# Patient Record
Sex: FEMALE | Race: BLACK OR AFRICAN AMERICAN | NOT HISPANIC OR LATINO | Employment: PART TIME | URBAN - METROPOLITAN AREA
[De-identification: names, ages, dates, MRNs, and addresses within clinical notes are randomized per-mention and may not be internally consistent; named-entity substitution may affect disease eponyms.]

---

## 2018-07-07 ENCOUNTER — HOSPITAL ENCOUNTER (EMERGENCY)
Facility: HOSPITAL | Age: 53
Discharge: HOME | End: 2018-07-07
Attending: EMERGENCY MEDICINE
Payer: MEDICAID

## 2018-07-07 VITALS
BODY MASS INDEX: 34.49 KG/M2 | OXYGEN SATURATION: 99 % | HEIGHT: 60 IN | HEART RATE: 76 BPM | DIASTOLIC BLOOD PRESSURE: 76 MMHG | WEIGHT: 175.69 LBS | SYSTOLIC BLOOD PRESSURE: 134 MMHG | TEMPERATURE: 98 F | RESPIRATION RATE: 17 BRPM

## 2018-07-07 DIAGNOSIS — K92.1 BLOOD IN STOOL: Primary | ICD-10-CM

## 2018-07-07 DIAGNOSIS — K64.4 EXTERNAL HEMORRHOID: ICD-10-CM

## 2018-07-07 LAB
ALBUMIN SERPL BCP-MCNC: 3.7 G/DL
ALP SERPL-CCNC: 147 U/L
ALT SERPL W/O P-5'-P-CCNC: 28 U/L
ANION GAP SERPL CALC-SCNC: 9 MMOL/L
AST SERPL-CCNC: 19 U/L
BASOPHILS # BLD AUTO: 0.02 K/UL
BASOPHILS NFR BLD: 0.3 %
BILIRUB SERPL-MCNC: 0.4 MG/DL
BUN SERPL-MCNC: 11 MG/DL
CALCIUM SERPL-MCNC: 9.7 MG/DL
CHLORIDE SERPL-SCNC: 106 MMOL/L
CO2 SERPL-SCNC: 26 MMOL/L
CREAT SERPL-MCNC: 0.7 MG/DL
DIFFERENTIAL METHOD: ABNORMAL
EOSINOPHIL # BLD AUTO: 0.3 K/UL
EOSINOPHIL NFR BLD: 4.6 %
ERYTHROCYTE [DISTWIDTH] IN BLOOD BY AUTOMATED COUNT: 14.5 %
EST. GFR  (AFRICAN AMERICAN): >60 ML/MIN/1.73 M^2
EST. GFR  (NON AFRICAN AMERICAN): >60 ML/MIN/1.73 M^2
GLUCOSE SERPL-MCNC: 99 MG/DL
HCT VFR BLD AUTO: 35.2 %
HGB BLD-MCNC: 11.1 G/DL
IMM GRANULOCYTES # BLD AUTO: 0.03 K/UL
IMM GRANULOCYTES NFR BLD AUTO: 0.4 %
LYMPHOCYTES # BLD AUTO: 1.9 K/UL
LYMPHOCYTES NFR BLD: 28.2 %
MCH RBC QN AUTO: 28.5 PG
MCHC RBC AUTO-ENTMCNC: 31.5 G/DL
MCV RBC AUTO: 91 FL
MONOCYTES # BLD AUTO: 0.4 K/UL
MONOCYTES NFR BLD: 6.5 %
NEUTROPHILS # BLD AUTO: 4 K/UL
NEUTROPHILS NFR BLD: 60 %
NRBC BLD-RTO: 0 /100 WBC
PLATELET # BLD AUTO: 273 K/UL
PMV BLD AUTO: 10.6 FL
POTASSIUM SERPL-SCNC: 4.2 MMOL/L
PROT SERPL-MCNC: 8.2 G/DL
RBC # BLD AUTO: 3.89 M/UL
SODIUM SERPL-SCNC: 141 MMOL/L
WBC # BLD AUTO: 6.74 K/UL

## 2018-07-07 PROCEDURE — 99282 EMERGENCY DEPT VISIT SF MDM: CPT | Mod: ,,, | Performed by: PHYSICIAN ASSISTANT

## 2018-07-07 PROCEDURE — 87493 C DIFF AMPLIFIED PROBE: CPT

## 2018-07-07 PROCEDURE — 87324 CLOSTRIDIUM AG IA: CPT

## 2018-07-07 PROCEDURE — 85025 COMPLETE CBC W/AUTO DIFF WBC: CPT

## 2018-07-07 PROCEDURE — 99283 EMERGENCY DEPT VISIT LOW MDM: CPT

## 2018-07-07 PROCEDURE — 80053 COMPREHEN METABOLIC PANEL: CPT

## 2018-07-07 RX ORDER — CLINDAMYCIN HYDROCHLORIDE 150 MG/1
150 CAPSULE ORAL
COMMUNITY
End: 2018-07-09 | Stop reason: ALTCHOICE

## 2018-07-07 NOTE — ED NOTES
Patient received with complaint of 1 episode of rectal bleeding; dark red in color; Pt. Reports recent mouth abscess and was prescribed clindamycin taking approximately 600mg per day for past week.  Saturday and Sunday pt. Reports taking 1120mg total per day.  Pt. Reports that dose made her sick.  Monday-Friday pt. Took 600mg total per day.  Pt. Did not take any of it today.   denies shortness of breath, dizziness, or chest pain.      No LDA's in place on arrival to department.    No family present.    Pain:  Rated 5/10. Cramping pain in abdomen    Psychosocial:  Patient is calm and cooperative.  Patients insight and judgement are appropriate to situation.  Appears clean, well maintained, with clothing appropriate to environment.  No evidence of hallucinations, delusions, or psychosis.    Neuro:  Eyes open spontaneously.  Awake, alert.  Oriented x 4.  Speech clear and appropriate.  Tolerating saliva secretions well.  Able to follow commands, demonstrating ability to actively and appropriately communicate within context of current conversation.  Symmetrical facial muscles.  Moving all extremities well with no noted weakness.      Airway:  Bilateral chest rise and fall.  RR regular and non labored.  Air entry patent and clear to lobes of the lungs.     Circulatory:  Skin warm, dry.  Apical and radial pulses strong and regular.  Capillary refill/skin blanching less than 3 seconds to distal of 4 extremities.    Abdomen:  Abdomen soft and non distended.  Positive normo-active bowel sounds x 4 quadrants.    Urinary:  Patient reports routine urination without pain, frequency, or urgency.  Voids independently.  Reports urine appears kirk/yellow in color.    Extremities:  No redness, heat, swelling, deformity, or pain.    Skin:  Intact with no bruising/discolorations noted.

## 2018-07-07 NOTE — ED NOTES
Pt placed on continuous cardiac and pulse ox monitoring with non-invasive blood pressure to cycle every 30 minutes.

## 2018-07-07 NOTE — DISCHARGE INSTRUCTIONS
Take probiotic after finishing antibiotic prescription. If anything concerning shows up in your stool sample, we will call you. Return to the ED immediately if you develop worsening diarrhea, abdominal pain, more blood in stool, or become dizzy or faint.    Our goal in the emergency department is to always give you outstanding care and exceptional service. You may receive a survey by mail or e-mail in the next week regarding your experience in our ED. We would greatly appreciate your completing and returning the survey. Your feedback provides us with a way to recognize our staff who give very good care and it helps us learn how to improve when your experience was below our aspiration of excellence.

## 2018-07-08 LAB
C DIFF GDH STL QL: POSITIVE
C DIFF TOX A+B STL QL IA: NEGATIVE
C DIFF TOX GENS STL QL NAA+PROBE: POSITIVE

## 2018-07-09 ENCOUNTER — HOSPITAL ENCOUNTER (EMERGENCY)
Facility: HOSPITAL | Age: 53
Discharge: HOME | End: 2018-07-09
Attending: EMERGENCY MEDICINE
Payer: MEDICAID

## 2018-07-09 VITALS
WEIGHT: 175 LBS | HEART RATE: 87 BPM | TEMPERATURE: 99 F | SYSTOLIC BLOOD PRESSURE: 124 MMHG | RESPIRATION RATE: 19 BRPM | HEIGHT: 60 IN | OXYGEN SATURATION: 100 % | BODY MASS INDEX: 34.36 KG/M2 | DIASTOLIC BLOOD PRESSURE: 63 MMHG

## 2018-07-09 DIAGNOSIS — R50.9 FEVER: ICD-10-CM

## 2018-07-09 DIAGNOSIS — A04.72 CLOSTRIDIUM DIFFICILE DIARRHEA: Primary | ICD-10-CM

## 2018-07-09 LAB
ALBUMIN SERPL BCP-MCNC: 3.3 G/DL
ALP SERPL-CCNC: 128 U/L
ALT SERPL W/O P-5'-P-CCNC: 22 U/L
ANION GAP SERPL CALC-SCNC: 11 MMOL/L
AST SERPL-CCNC: 17 U/L
BASOPHILS # BLD AUTO: 0.02 K/UL
BASOPHILS NFR BLD: 0.2 %
BILIRUB SERPL-MCNC: 0.5 MG/DL
BILIRUB UR QL STRIP: NEGATIVE
BUN SERPL-MCNC: 9 MG/DL
CALCIUM SERPL-MCNC: 9 MG/DL
CHLORIDE SERPL-SCNC: 106 MMOL/L
CLARITY UR REFRACT.AUTO: ABNORMAL
CO2 SERPL-SCNC: 21 MMOL/L
COLOR UR AUTO: YELLOW
CREAT SERPL-MCNC: 0.7 MG/DL
DIFFERENTIAL METHOD: ABNORMAL
EOSINOPHIL # BLD AUTO: 0.2 K/UL
EOSINOPHIL NFR BLD: 2.7 %
ERYTHROCYTE [DISTWIDTH] IN BLOOD BY AUTOMATED COUNT: 14.7 %
EST. GFR  (AFRICAN AMERICAN): >60 ML/MIN/1.73 M^2
EST. GFR  (NON AFRICAN AMERICAN): >60 ML/MIN/1.73 M^2
GLUCOSE SERPL-MCNC: 106 MG/DL
GLUCOSE UR QL STRIP: NEGATIVE
HCT VFR BLD AUTO: 33.5 %
HGB BLD-MCNC: 10.7 G/DL
HGB UR QL STRIP: NEGATIVE
IMM GRANULOCYTES # BLD AUTO: 0.01 K/UL
IMM GRANULOCYTES NFR BLD AUTO: 0.1 %
KETONES UR QL STRIP: NEGATIVE
LACTATE SERPL-SCNC: 1.3 MMOL/L
LEUKOCYTE ESTERASE UR QL STRIP: NEGATIVE
LYMPHOCYTES # BLD AUTO: 1.5 K/UL
LYMPHOCYTES NFR BLD: 17.7 %
MCH RBC QN AUTO: 28.6 PG
MCHC RBC AUTO-ENTMCNC: 31.9 G/DL
MCV RBC AUTO: 90 FL
MONOCYTES # BLD AUTO: 0.5 K/UL
MONOCYTES NFR BLD: 5.7 %
NEUTROPHILS # BLD AUTO: 6.3 K/UL
NEUTROPHILS NFR BLD: 73.6 %
NITRITE UR QL STRIP: NEGATIVE
NRBC BLD-RTO: 0 /100 WBC
PH UR STRIP: 7 [PH] (ref 5–8)
PLATELET # BLD AUTO: 250 K/UL
PMV BLD AUTO: 10.9 FL
POTASSIUM SERPL-SCNC: 4 MMOL/L
PROT SERPL-MCNC: 7.5 G/DL
PROT UR QL STRIP: NEGATIVE
RBC # BLD AUTO: 3.74 M/UL
SODIUM SERPL-SCNC: 138 MMOL/L
SP GR UR STRIP: 1.01 (ref 1–1.03)
URN SPEC COLLECT METH UR: ABNORMAL
UROBILINOGEN UR STRIP-ACNC: NEGATIVE EU/DL
WBC # BLD AUTO: 8.53 K/UL

## 2018-07-09 PROCEDURE — 80053 COMPREHEN METABOLIC PANEL: CPT

## 2018-07-09 PROCEDURE — 85025 COMPLETE CBC W/AUTO DIFF WBC: CPT

## 2018-07-09 PROCEDURE — 99285 EMERGENCY DEPT VISIT HI MDM: CPT | Mod: ,,, | Performed by: PHYSICIAN ASSISTANT

## 2018-07-09 PROCEDURE — 87040 BLOOD CULTURE FOR BACTERIA: CPT | Mod: 59

## 2018-07-09 PROCEDURE — 99284 EMERGENCY DEPT VISIT MOD MDM: CPT | Mod: 25

## 2018-07-09 PROCEDURE — 83605 ASSAY OF LACTIC ACID: CPT

## 2018-07-09 PROCEDURE — 93005 ELECTROCARDIOGRAM TRACING: CPT

## 2018-07-09 PROCEDURE — 25000003 PHARM REV CODE 250: Performed by: EMERGENCY MEDICINE

## 2018-07-09 PROCEDURE — 81003 URINALYSIS AUTO W/O SCOPE: CPT

## 2018-07-09 PROCEDURE — 96361 HYDRATE IV INFUSION ADD-ON: CPT

## 2018-07-09 PROCEDURE — 25000003 PHARM REV CODE 250: Performed by: PHYSICIAN ASSISTANT

## 2018-07-09 PROCEDURE — 96360 HYDRATION IV INFUSION INIT: CPT

## 2018-07-09 PROCEDURE — 93010 ELECTROCARDIOGRAM REPORT: CPT | Mod: ,,, | Performed by: INTERNAL MEDICINE

## 2018-07-09 RX ORDER — METRONIDAZOLE 500 MG/1
500 TABLET ORAL 3 TIMES DAILY
Qty: 42 TABLET | Refills: 0 | Status: SHIPPED | OUTPATIENT
Start: 2018-07-09 | End: 2018-07-23

## 2018-07-09 RX ORDER — ACETAMINOPHEN 325 MG/1
650 TABLET ORAL
Status: COMPLETED | OUTPATIENT
Start: 2018-07-09 | End: 2018-07-09

## 2018-07-09 RX ORDER — VANCOMYCIN HYDROCHLORIDE 125 MG/1
125 CAPSULE ORAL EVERY 6 HOURS
Qty: 56 CAPSULE | Refills: 0 | Status: SHIPPED | OUTPATIENT
Start: 2018-07-09 | End: 2018-07-09 | Stop reason: ALTCHOICE

## 2018-07-09 RX ADMIN — ACETAMINOPHEN 650 MG: 325 TABLET, FILM COATED ORAL at 10:07

## 2018-07-09 RX ADMIN — Medication 125 MG: at 10:07

## 2018-07-09 RX ADMIN — SODIUM CHLORIDE 2382 ML: 0.9 INJECTION, SOLUTION INTRAVENOUS at 08:07

## 2018-07-09 NOTE — ED PROVIDER NOTES
Encounter Date: 7/9/2018    SCRIBE #1 NOTE: I, Mary Muhammad, am scribing for, and in the presence of,  Dr. Lozano. I have scribed the following portions of the note - the APC attestation.       History     Chief Complaint   Patient presents with    Rectal Bleeding     now fever, seen here 2d ago and waiting stool sample results, + cdiff resulted     This is a 53 year old female with a PMH of remote PE who presents to the ED with a chief complaint of diarrhea. Patient was recently treated for a dental abscess with clindamycin. She presented over the weekend with bloody diarrhea. Stool cultures are positive for C Diff antigen and by PCR. Patient reports onset of fever and chills yesterday. She continues to have approximately 3 episodes of bloody diarrhea daily. She is tolerating normal oral intake, denies URI syomptoms, chest pain, SOB, vomiting, abdominal pain, dysuria, or additional complaints. Patient reports a baseline hx of anemia.          Review of patient's allergies indicates:  No Known Allergies  Past Medical History:   Diagnosis Date    Anticoagulant long-term use     [id number]    Pulmonary embolism     2012     History reviewed. No pertinent surgical history.  History reviewed. No pertinent family history.  Social History   Substance Use Topics    Smoking status: Never Smoker    Smokeless tobacco: Never Used    Alcohol use No     Review of Systems   Constitutional: Negative for fever.   HENT: Negative for sore throat.    Respiratory: Negative for shortness of breath.    Cardiovascular: Negative for chest pain.   Gastrointestinal: Positive for diarrhea. Negative for abdominal pain, nausea and vomiting.   Genitourinary: Negative for dysuria.   Musculoskeletal: Negative for back pain.   Skin: Negative for rash.   Neurological: Negative for weakness.   Hematological: Does not bruise/bleed easily.       Physical Exam     Initial Vitals [07/09/18 0823]   BP Pulse Resp Temp SpO2   139/84 110 16 (!)  100.4 °F (38 °C) 96 %      MAP       --         Physical Exam    Constitutional: She appears well-developed and well-nourished.   HENT:   Head: Atraumatic.   Eyes: Conjunctivae and EOM are normal. Pupils are equal, round, and reactive to light.   Cardiovascular: Regular rhythm and normal heart sounds. Tachycardia present.    Pulmonary/Chest: Breath sounds normal. No respiratory distress. She has no wheezes. She has no rhonchi. She has no rales.   Abdominal: Soft. Bowel sounds are normal. There is no tenderness. There is no rebound and no guarding.   Neurological: She is alert and oriented to person, place, and time.   Skin: Skin is warm and dry. No rash noted.         ED Course   Procedures  Labs Reviewed   CBC W/ AUTO DIFFERENTIAL - Abnormal; Notable for the following:        Result Value    RBC 3.74 (*)     Hemoglobin 10.7 (*)     Hematocrit 33.5 (*)     MCHC 31.9 (*)     RDW 14.7 (*)     Gran% 73.6 (*)     Lymph% 17.7 (*)     All other components within normal limits   COMPREHENSIVE METABOLIC PANEL - Abnormal; Notable for the following:     CO2 21 (*)     Albumin 3.3 (*)     All other components within normal limits   URINALYSIS, REFLEX TO URINE CULTURE - Abnormal; Notable for the following:     Appearance, UA Hazy (*)     All other components within normal limits    Narrative:     Preferred Collection Type->Urine, Clean Catch   CULTURE, BLOOD    Narrative:     Aerobic and anaerobic   CULTURE, BLOOD    Narrative:     Aerobic and anaerobic   LACTIC ACID, PLASMA          Imaging Results          X-Ray Chest AP Portable (Final result)  Result time 07/09/18 09:22:21    Final result by Nilay Bermeo III, MD (07/09/18 09:22:21)                 Impression:      See above    No acute process seen.      Electronically signed by: Nilay Bermeo MD  Date:    07/09/2018  Time:    09:22             Narrative:    EXAMINATION:  XR CHEST AP PORTABLE    CLINICAL HISTORY:  Sepsis;    FINDINGS:  Heart size is normal.  Lungs are  clear.  Bones showed DJD.                                 Medical Decision Making:   History:   Old Medical Records: I decided to obtain old medical records.  Clinical Tests:   Lab Tests: Ordered and Reviewed  Radiological Study: Reviewed and Ordered  Medical Tests: Reviewed and Ordered       APC / Resident Notes:   53 year old female presents with persistent bloody diarrhea and fever.  On exam temp is 100.4F, . She appears stable, nontoxic. Abdomen is soft, nontender.     I reviewed her stool cultures - C Diff antigen positive, PCR positive.  Will check labs, give fluids, and reassess.    Labs with no leukocytosis, H&H 10/33 from 11/35 on last visit (7/7/18). Renal function wnl. Negative lactate. Blood cultures are in process.    I discussed these findings with the patient. She is clinically well appearing and with improvement in her vitals I feel that she would be a good candidate for outpatient treatment. Offered admission for observation, shared decision making with patient who agrees with plan for outpatient treatment. Discussed oral vancomycin as first line medication, however given cost of the medication and first infection we elected to initiate treatment with Flagyl, however I did discuss with the patient the potential need for advancing to vanc if she fails to improve on the Flagyl. Will call to follow up with her in a few days. Detailed return to ED precautions discussed. Patient and family member verbalized understanding and agree with course of treatment. I discussed the care of this patient with my supervising MD.        Lenoreibe Attestation:   Scribe #1: I performed the above scribed service and the documentation accurately describes the services I performed. I attest to the accuracy of the note.    Attending Attestation:     Physician Attestation Statement for NP/PA:   I discussed this assessment and plan of this patient with the NP/PA, but I did not personally examine the patient. The face to  face encounter was performed by the NP/PA.                     Clinical Impression:   The primary encounter diagnosis was Clostridium difficile diarrhea. A diagnosis of Fever was also pertinent to this visit.      Disposition:   Disposition: Discharged  Condition: Stable                        Alissa Pemberton PA-C  07/09/18 1538       Kedar Lozano MD  07/13/18 0873

## 2018-07-09 NOTE — ED NOTES
Visitor arrived at pt bedside, with pt permission visitor educated on proper PPE and provided gown and gloves, verbalized understanding.

## 2018-07-09 NOTE — ED NOTES
HOURLY ROUNDING COMPLETE. Patient resting in stretcher and is in NAD at this time. Pt is awake alert and oriented x4, respirations even and unlabored. Pt updated on POC. Visitor at bedside. Bed low and locked with side rails up x2, call bell in pt reach. Pt voices no needs at this time.

## 2018-07-09 NOTE — ED NOTES
Bedside commode brought to bedside, pt urinated and had BM, urine yellow and clear, BM soft formed yellow with bright red blood noted.

## 2018-07-09 NOTE — ED TRIAGE NOTES
"Pt arrived to ED with CC of rectal bleeding onset "last week" - reports blood became darker and fever onset this morning. Pt reports also intermittent abdominal cramping and SOB. Denies CP or nausea. No other complaints at this time.    Pt reports seen for diarrhea on Friday for diarrhea and provided stool sample for testing, pt c-diff positive per chart, special contact isolation precautions initiated.     Patient identifiers verified and correct for Brando Leung.    LOC: The patient is awake, alert and oriented x 4. Pt is speaking appropriately, no slurred speech.  APPEARANCE: Patient resting and in no acute distress. Pt is clean and well groomed. No JVD visible.  SKIN: Skin is warm dry and intact, and color is consistent with ethnicity. No tenting observed and capillary refill <3 seconds. No clubbing noted to nail beds. No breakdown or brusing visible and mucus membranes moist and acyanotic.  MUSCULOSKELETAL: Full range of motion present in all extremities. Hand  equal and leg strength strong +5 bilaterally.  RESPIRATORY: Airway is open and patent. Respirations-unlabored, regular rate, equal bilaterally on inspiration and expiration. No accessory muscle use noted. Lungs clear to auscultation in all fields bilaterally anterior and posterior.   CARDIAC: Patient tachycardic. No peripheral edema noted, and patient has no c/o chest pain.  ABDOMEN: Soft, mild tenderness noted throughout, no distention noted.   NEUROLOGIC: Eyes open spontaneously and facial expression symmetrical. Pt behavior appropriate to situation, and pt follows commands.  Pt reports sensation present in all extremities when touched with a finger. PERRLA  : No complaints of frequency, burning, urgency or blood in the urine.     "

## 2018-07-09 NOTE — DISCHARGE INSTRUCTIONS
Start taking Flagyl three times a day for 14 days.    Take Tylenol or ibuprofen as directed for fever.    Return to the ER if you have  fever that is not controlled with tylenol or ibuprofen, vomiting, abdominal pain, weakness, dizziness, or feeling faint, or for any new or worsening symptoms.

## 2018-07-09 NOTE — ED NOTES
Pt. Resting in bed in NAD. RR e/u. Continuous cardiac, BP, and O2 monitoring in progress. Explanation of care/wait provided. Bed in low, locked position with rails up and call bell in reach. Will continue to monitor.

## 2018-07-13 ENCOUNTER — TELEPHONE (OUTPATIENT)
Dept: EMERGENCY MEDICINE | Facility: HOSPITAL | Age: 53
End: 2018-07-13

## 2018-07-13 NOTE — TELEPHONE ENCOUNTER
Contacted patient to f/u from her recent ED visit with C diff diarrhea. She reports significant improvement with resolution of fever, rectal bleeding, and diarrhea. She is taking Flagyl, as prescribed. She plans to see internal medicine next week. Advised pt to continue with antibiotics until completion and return to ED precautions were discussed.

## 2018-07-14 LAB
BACTERIA BLD CULT: NORMAL
BACTERIA BLD CULT: NORMAL

## 2019-02-01 ENCOUNTER — HOSPITAL ENCOUNTER (EMERGENCY)
Facility: HOSPITAL | Age: 54
Discharge: HOME | End: 2019-02-02
Attending: EMERGENCY MEDICINE
Payer: MEDICAID

## 2019-02-01 VITALS
SYSTOLIC BLOOD PRESSURE: 164 MMHG | BODY MASS INDEX: 33.04 KG/M2 | WEIGHT: 175 LBS | TEMPERATURE: 98 F | RESPIRATION RATE: 18 BRPM | OXYGEN SATURATION: 98 % | HEART RATE: 80 BPM | DIASTOLIC BLOOD PRESSURE: 79 MMHG | HEIGHT: 61 IN

## 2019-02-01 DIAGNOSIS — M25.539 WRIST PAIN, ACUTE, UNSPECIFIED LATERALITY: ICD-10-CM

## 2019-02-01 DIAGNOSIS — M54.5 ACUTE LOW BACK PAIN, UNSPECIFIED BACK PAIN LATERALITY, WITH SCIATICA PRESENCE UNSPECIFIED: Primary | ICD-10-CM

## 2019-02-01 DIAGNOSIS — H93.19 TINNITUS, UNSPECIFIED LATERALITY: ICD-10-CM

## 2019-02-01 LAB
ALBUMIN SERPL BCP-MCNC: 3.9 G/DL
ALP SERPL-CCNC: 169 U/L
ALT SERPL W/O P-5'-P-CCNC: 21 U/L
ANION GAP SERPL CALC-SCNC: 12 MMOL/L
AST SERPL-CCNC: 23 U/L
B-HCG UR QL: NEGATIVE
BASOPHILS # BLD AUTO: 0.03 K/UL
BASOPHILS NFR BLD: 0.4 %
BILIRUB SERPL-MCNC: 0.4 MG/DL
BUN SERPL-MCNC: 11 MG/DL
CALCIUM SERPL-MCNC: 9.9 MG/DL
CHLORIDE SERPL-SCNC: 105 MMOL/L
CO2 SERPL-SCNC: 21 MMOL/L
CREAT SERPL-MCNC: 0.7 MG/DL
CTP QC/QA: YES
DIFFERENTIAL METHOD: ABNORMAL
EOSINOPHIL # BLD AUTO: 0.2 K/UL
EOSINOPHIL NFR BLD: 2.4 %
ERYTHROCYTE [DISTWIDTH] IN BLOOD BY AUTOMATED COUNT: 15.1 %
EST. GFR  (AFRICAN AMERICAN): >60 ML/MIN/1.73 M^2
EST. GFR  (NON AFRICAN AMERICAN): >60 ML/MIN/1.73 M^2
GLUCOSE SERPL-MCNC: 96 MG/DL
HCT VFR BLD AUTO: 40.2 %
HGB BLD-MCNC: 12.9 G/DL
IMM GRANULOCYTES # BLD AUTO: 0.02 K/UL
IMM GRANULOCYTES NFR BLD AUTO: 0.3 %
LIPASE SERPL-CCNC: 62 U/L
LYMPHOCYTES # BLD AUTO: 2.3 K/UL
LYMPHOCYTES NFR BLD: 34.2 %
MCH RBC QN AUTO: 29.1 PG
MCHC RBC AUTO-ENTMCNC: 32.1 G/DL
MCV RBC AUTO: 91 FL
MONOCYTES # BLD AUTO: 0.3 K/UL
MONOCYTES NFR BLD: 4 %
NEUTROPHILS # BLD AUTO: 4 K/UL
NEUTROPHILS NFR BLD: 58.7 %
NRBC BLD-RTO: 0 /100 WBC
PLATELET # BLD AUTO: 252 K/UL
PMV BLD AUTO: 11 FL
POTASSIUM SERPL-SCNC: 4.9 MMOL/L
PROT SERPL-MCNC: 8.8 G/DL
RBC # BLD AUTO: 4.43 M/UL
SODIUM SERPL-SCNC: 138 MMOL/L
WBC # BLD AUTO: 6.78 K/UL

## 2019-02-01 PROCEDURE — 99284 EMERGENCY DEPT VISIT MOD MDM: CPT | Mod: 25

## 2019-02-01 PROCEDURE — 85379 FIBRIN DEGRADATION QUANT: CPT

## 2019-02-01 PROCEDURE — 85025 COMPLETE CBC W/AUTO DIFF WBC: CPT

## 2019-02-01 PROCEDURE — 99284 EMERGENCY DEPT VISIT MOD MDM: CPT | Mod: ,,, | Performed by: EMERGENCY MEDICINE

## 2019-02-01 PROCEDURE — 81025 URINE PREGNANCY TEST: CPT | Performed by: EMERGENCY MEDICINE

## 2019-02-01 PROCEDURE — 80053 COMPREHEN METABOLIC PANEL: CPT

## 2019-02-01 PROCEDURE — 25000003 PHARM REV CODE 250: Performed by: EMERGENCY MEDICINE

## 2019-02-01 PROCEDURE — 99284 PR EMERGENCY DEPT VISIT,LEVEL IV: ICD-10-PCS | Mod: ,,, | Performed by: EMERGENCY MEDICINE

## 2019-02-01 PROCEDURE — 83690 ASSAY OF LIPASE: CPT

## 2019-02-01 RX ORDER — ACETAMINOPHEN 325 MG/1
650 TABLET ORAL
Status: COMPLETED | OUTPATIENT
Start: 2019-02-01 | End: 2019-02-01

## 2019-02-01 RX ORDER — ACETAMINOPHEN 500 MG
500 TABLET ORAL EVERY 6 HOURS PRN
COMMUNITY

## 2019-02-01 RX ADMIN — ACETAMINOPHEN 650 MG: 325 TABLET ORAL at 11:02

## 2019-02-02 LAB — D DIMER PPP IA.FEU-MCNC: 1.1 MG/L FEU

## 2019-02-02 PROCEDURE — 25500020 PHARM REV CODE 255: Performed by: EMERGENCY MEDICINE

## 2019-02-02 RX ADMIN — IOHEXOL 75 ML: 350 INJECTION, SOLUTION INTRAVENOUS at 01:02

## 2019-02-02 NOTE — ED PROVIDER NOTES
Encounter Date: 2/1/2019    SCRIBE #1 NOTE: I, Jose Dunbar , am scribing for, and in the presence of,  Christian Chi MD. I have scribed the entire note.       History     Chief Complaint   Patient presents with    Back Pain     reports lower back pain, bilateral wrist pain and ears ringing     Pt is a 54 yo F with pmhx of PE, long-term anticoagulant use, self d/c, and C. Diff, pancreatitis?  presents to the ED for evaluation of lower back pain, ear ringing, and wrist pain. Also notes SOB and cough. States that she has had back pain for months, no weakness, numbness, urinary bowel incontinence, no saddle anesthesia, no trauma. No dysuria, no no hematuria.    Her ear ringing has been going on for 3 days.  No ear pain.  No significant headache. No significant changes in hearing.  She takes aspirin occasionally, maximum 81 per day.     States that the feeling that she has now is the same as the pain she had during her PE. Was on blood thinners for a several years, but no longer is.  She stopped on her own.     Denies any chest pain, fever, runny nose, sore throat, headache, dizziness, lower extremity swelling, or pain, or any other complaint at this time.     Indicates that in August 2018 she was diagnosed with C. Diff and an inflamed pancreas.       The history is provided by the patient and medical records.     Review of patient's allergies indicates:  No Known Allergies  Past Medical History:   Diagnosis Date    Anticoagulant long-term use     [id number]    Pulmonary embolism     2012     History reviewed. No pertinent surgical history.  History reviewed. No pertinent family history.  Social History     Tobacco Use    Smoking status: Never Smoker    Smokeless tobacco: Never Used   Substance Use Topics    Alcohol use: No    Drug use: Yes     Types: Marijuana     Review of Systems  Constitutional:  No Fever, No Chills,   Eyes: No Vision Changes  ENT/Mouth: No sore throat, No rhinorrhea. Ear  ringing.  Cardiovascular:  No Chest Pain, No Palpitations  Respiratory:  Cough, SOB  Gastrointestinal:  No Nausea, No Vomiting, No Diarrhea, No abdo pain.  Genitourinary:  No  pain, No dysuria   Musculoskeletal:  No Arthralgias, No Back Pain, No Neck Pain, No recent trauma. Wrist pain.   Skin:  No skin Lesions  Neuro:  No Weakness, No Numbness, No Paresthesias, No Dizziness, No Headache      Physical Exam     Initial Vitals [02/01/19 2010]   BP Pulse Resp Temp SpO2   (!) 164/79 80 18 98.1 °F (36.7 °C) 98 %      MAP       --         Physical Exam    Nursing note and vitals reviewed.    Physical Exam:  GENERAL APPEARANCE: Well developed, well nourished, in no acute distress.  HENT: Normocephalic, atraumatic. TMs clear bilaterally.  EYES: Sclerae anicteric   NECK: Supple, no thyroid enlargement  CARDIOVASCULAR: Regular rate and rhythm without any murmurs, gallops, rubs.  LUNGS: Speaking in full sentences. Breathing comfortably. Auscultation of the lungs revealed normal breath sounds b/l  ABDOMEN: Soft and nontender, no masses, no rebound or guarding   NEUROLOGIC: Alert, interacting normally. No facial droop. Cranial nerves unremarkable. Nml gait.  Neurovascular tacked hands bilaterally.   MSK: Moving all four extremities. 5/5 strength in upper and lower extremities. Wrists bilaterally with no focal tenderness, no excessive warmth, normal range of motion wrist.  No LE swelling of tenderness.   Skin: Warm and dry. No visible rash on exposed areas of skin.    Psych: Mood and affect normal.       ED Course   Procedures  Labs Reviewed - No data to display       Imaging Results    None          Medical Decision Making:   ED Management:  Atypical symptoms, chronic back pain and bilateral wrist pain that are likely related to moving her elderly mother/MSK pain.    Exam is benign, however pt states that symptoms are similar to PE symptoms however atypical.   Patient has no chest pain, complains of intermittent shortness of  breath although not now, no dizziness, no numbness in the shoulder/arms, no numbness or pain in the neck, this is not consistent with ACS.    Pt also states she has hx of pancreatitis, and also complains feeling similar to that time.     Thus will obtain a set of labs and dimer.     12:42 AM  Labs are unremarkable  Time or is positive at 1.1.  CT scan ordered of the chest.  Patient informed.  Signed out to night team, pending CT of the chest.  CT of the chest is negative patient can be discharged home with outpatient follow-up.               Scribe Attestation:   Scribe #1: I performed the above scribed service and the documentation accurately describes the services I performed. I attest to the accuracy of the note.               Clinical Impression:   There were no encounter diagnoses.                             Christian Chi MD  02/02/19 0045

## 2019-02-02 NOTE — ED TRIAGE NOTES
"Brando Leung, a 53 y.o. female presents to the ED w/ complaint of lower back pain, h/a, ringing ears, and bilateral wrist pain. "I have to take my wig off because my head hurts so much." Pt reports symptoms have been on and off a for a couple month. Pt denies fall/trauma, blurred vision, loss of balance, CP, fever/chills, hx of arthritis.    Triage note:  Chief Complaint   Patient presents with    Back Pain     reports lower back pain, bilateral wrist pain and ears ringing     Review of patient's allergies indicates:  No Known Allergies  Past Medical History:   Diagnosis Date    Anticoagulant long-term use     [id number]    Pulmonary embolism     2012       "

## 2022-08-23 ENCOUNTER — HOSPITAL ENCOUNTER (EMERGENCY)
Facility: HOSPITAL | Age: 57
Discharge: HOME | End: 2022-08-23
Attending: EMERGENCY MEDICINE
Payer: MEDICAID

## 2022-08-23 VITALS
RESPIRATION RATE: 16 BRPM | DIASTOLIC BLOOD PRESSURE: 73 MMHG | HEIGHT: 60 IN | TEMPERATURE: 98 F | OXYGEN SATURATION: 99 % | BODY MASS INDEX: 37.3 KG/M2 | SYSTOLIC BLOOD PRESSURE: 144 MMHG | HEART RATE: 67 BPM | WEIGHT: 190 LBS

## 2022-08-23 DIAGNOSIS — G89.29 CHRONIC RIGHT-SIDED LOW BACK PAIN WITHOUT SCIATICA: Primary | ICD-10-CM

## 2022-08-23 DIAGNOSIS — R91.1 PULMONARY NODULE: ICD-10-CM

## 2022-08-23 DIAGNOSIS — M54.50 CHRONIC RIGHT-SIDED LOW BACK PAIN WITHOUT SCIATICA: Primary | ICD-10-CM

## 2022-08-23 DIAGNOSIS — R06.02 SHORTNESS OF BREATH: ICD-10-CM

## 2022-08-23 LAB
ALBUMIN SERPL BCP-MCNC: 4 G/DL (ref 3.5–5.2)
ALP SERPL-CCNC: 121 U/L (ref 55–135)
ALT SERPL W/O P-5'-P-CCNC: 29 U/L (ref 10–44)
ANION GAP SERPL CALC-SCNC: 9 MMOL/L (ref 8–16)
AST SERPL-CCNC: 18 U/L (ref 10–40)
BASOPHILS # BLD AUTO: 0.04 K/UL (ref 0–0.2)
BASOPHILS NFR BLD: 0.5 % (ref 0–1.9)
BILIRUB SERPL-MCNC: 0.3 MG/DL (ref 0.1–1)
BILIRUB UR QL STRIP: NEGATIVE
BUN SERPL-MCNC: 13 MG/DL (ref 6–20)
CALCIUM SERPL-MCNC: 10.1 MG/DL (ref 8.7–10.5)
CHLORIDE SERPL-SCNC: 109 MMOL/L (ref 95–110)
CLARITY UR REFRACT.AUTO: CLEAR
CO2 SERPL-SCNC: 22 MMOL/L (ref 23–29)
COLOR UR AUTO: YELLOW
CREAT SERPL-MCNC: 0.7 MG/DL (ref 0.5–1.4)
D DIMER PPP IA.FEU-MCNC: 0.52 MG/L FEU
DIFFERENTIAL METHOD: ABNORMAL
EOSINOPHIL # BLD AUTO: 0.3 K/UL (ref 0–0.5)
EOSINOPHIL NFR BLD: 3.4 % (ref 0–8)
ERYTHROCYTE [DISTWIDTH] IN BLOOD BY AUTOMATED COUNT: 14.3 % (ref 11.5–14.5)
EST. GFR  (NO RACE VARIABLE): >60 ML/MIN/1.73 M^2
GLUCOSE SERPL-MCNC: 124 MG/DL (ref 70–110)
GLUCOSE UR QL STRIP: NEGATIVE
HCT VFR BLD AUTO: 36.4 % (ref 37–48.5)
HCV AB SERPL QL IA: NEGATIVE
HGB BLD-MCNC: 12 G/DL (ref 12–16)
HGB UR QL STRIP: ABNORMAL
HIV 1+2 AB+HIV1 P24 AG SERPL QL IA: NEGATIVE
IMM GRANULOCYTES # BLD AUTO: 0.02 K/UL (ref 0–0.04)
IMM GRANULOCYTES NFR BLD AUTO: 0.3 % (ref 0–0.5)
KETONES UR QL STRIP: NEGATIVE
LEUKOCYTE ESTERASE UR QL STRIP: NEGATIVE
LYMPHOCYTES # BLD AUTO: 3 K/UL (ref 1–4.8)
LYMPHOCYTES NFR BLD: 37.6 % (ref 18–48)
MCH RBC QN AUTO: 31.1 PG (ref 27–31)
MCHC RBC AUTO-ENTMCNC: 33 G/DL (ref 32–36)
MCV RBC AUTO: 94 FL (ref 82–98)
MONOCYTES # BLD AUTO: 0.5 K/UL (ref 0.3–1)
MONOCYTES NFR BLD: 6.3 % (ref 4–15)
NEUTROPHILS # BLD AUTO: 4.1 K/UL (ref 1.8–7.7)
NEUTROPHILS NFR BLD: 51.9 % (ref 38–73)
NITRITE UR QL STRIP: NEGATIVE
NRBC BLD-RTO: 0 /100 WBC
PH UR STRIP: 6 [PH] (ref 5–8)
PLATELET # BLD AUTO: 222 K/UL (ref 150–450)
PMV BLD AUTO: 10.6 FL (ref 9.2–12.9)
POTASSIUM SERPL-SCNC: 3.9 MMOL/L (ref 3.5–5.1)
PROT SERPL-MCNC: 8 G/DL (ref 6–8.4)
PROT UR QL STRIP: NEGATIVE
RBC # BLD AUTO: 3.86 M/UL (ref 4–5.4)
SODIUM SERPL-SCNC: 140 MMOL/L (ref 136–145)
SP GR UR STRIP: 1.03 (ref 1–1.03)
URN SPEC COLLECT METH UR: ABNORMAL
WBC # BLD AUTO: 7.97 K/UL (ref 3.9–12.7)

## 2022-08-23 PROCEDURE — 93010 EKG 12-LEAD: ICD-10-PCS | Mod: ,,, | Performed by: INTERNAL MEDICINE

## 2022-08-23 PROCEDURE — 85025 COMPLETE CBC W/AUTO DIFF WBC: CPT | Performed by: EMERGENCY MEDICINE

## 2022-08-23 PROCEDURE — 99285 EMERGENCY DEPT VISIT HI MDM: CPT | Mod: 25

## 2022-08-23 PROCEDURE — 87389 HIV-1 AG W/HIV-1&-2 AB AG IA: CPT | Performed by: EMERGENCY MEDICINE

## 2022-08-23 PROCEDURE — 93005 ELECTROCARDIOGRAM TRACING: CPT

## 2022-08-23 PROCEDURE — 93010 ELECTROCARDIOGRAM REPORT: CPT | Mod: ,,, | Performed by: INTERNAL MEDICINE

## 2022-08-23 PROCEDURE — 25500020 PHARM REV CODE 255: Performed by: EMERGENCY MEDICINE

## 2022-08-23 PROCEDURE — 80053 COMPREHEN METABOLIC PANEL: CPT | Performed by: EMERGENCY MEDICINE

## 2022-08-23 PROCEDURE — 99284 PR EMERGENCY DEPT VISIT,LEVEL IV: ICD-10-PCS | Mod: ,,, | Performed by: EMERGENCY MEDICINE

## 2022-08-23 PROCEDURE — 85379 FIBRIN DEGRADATION QUANT: CPT | Performed by: EMERGENCY MEDICINE

## 2022-08-23 PROCEDURE — 63600175 PHARM REV CODE 636 W HCPCS: Performed by: EMERGENCY MEDICINE

## 2022-08-23 PROCEDURE — 86803 HEPATITIS C AB TEST: CPT | Performed by: EMERGENCY MEDICINE

## 2022-08-23 PROCEDURE — 81003 URINALYSIS AUTO W/O SCOPE: CPT | Performed by: EMERGENCY MEDICINE

## 2022-08-23 PROCEDURE — 96374 THER/PROPH/DIAG INJ IV PUSH: CPT | Mod: 59

## 2022-08-23 PROCEDURE — 99284 EMERGENCY DEPT VISIT MOD MDM: CPT | Mod: ,,, | Performed by: EMERGENCY MEDICINE

## 2022-08-23 RX ORDER — IBUPROFEN 400 MG/1
400 TABLET ORAL EVERY 6 HOURS PRN
Qty: 20 TABLET | Refills: 0 | Status: SHIPPED | OUTPATIENT
Start: 2022-08-23

## 2022-08-23 RX ORDER — KETOROLAC TROMETHAMINE 30 MG/ML
15 INJECTION, SOLUTION INTRAMUSCULAR; INTRAVENOUS
Status: COMPLETED | OUTPATIENT
Start: 2022-08-23 | End: 2022-08-23

## 2022-08-23 RX ORDER — LIDOCAINE 50 MG/G
1 PATCH TOPICAL DAILY PRN
Qty: 15 PATCH | Refills: 0 | Status: SHIPPED | OUTPATIENT
Start: 2022-08-23

## 2022-08-23 RX ADMIN — IOHEXOL 75 ML: 350 INJECTION, SOLUTION INTRAVENOUS at 06:08

## 2022-08-23 RX ADMIN — KETOROLAC TROMETHAMINE 15 MG: 30 INJECTION, SOLUTION INTRAMUSCULAR; INTRAVENOUS at 04:08

## 2022-08-23 NOTE — ED PROVIDER NOTES
"History:  Brando Leung is a 57 y.o. female who presents to the ED with Generalized Body Aches (Pt states "my body hurts, it's been going on for a while". Pain increases when standing. Denies CP or abd pain. )    Described as 57-year-old female with a history of PE currently off anticoagulation presenting to the emergency department with low back pain.  She reports for the past few months she has had a now severe intermittent "deep" pain in her low back, worse with standing and walking, relieved by sitting or lying down, nonradiating, with associated shortness of breath.  She reports she takes 1500 mg of Tylenol 3 to 4 times a day , which relieves the pain.  She reports this feels like the last time she had a PE.  She also reports multiple falls, 3 in the past year, most recently a couple months ago.  She denies any bowel or bladder incontinence, saddle anesthesia, IV drug use, fevers.    Review of Systems: All systems reviewed and are negative except as per history of present illness.  Constitutional: Negative for fever.   HENT: Negative for congestion.    Respiratory: + for shortness of breath.    Cardiovascular: Negative for chest pain.   Gastrointestinal: Negative for abdominal pain.   Genitourinary: Negative for dysuria.   Musculoskeletal: +chronic low back pain  Skin: Negative for rash.   Neurological: Negative for focal weakness.   Psychiatric/Behavioral: Negative for memory loss.     Medications:   Previous Medications    ACETAMINOPHEN (TYLENOL) 500 MG TABLET    Take 500 mg by mouth every 6 (six) hours as needed for Pain.       PMH:   Past Medical History:   Diagnosis Date    Anticoagulant long-term use     2605-2845    Pulmonary embolism     2012     PSH: History reviewed. No pertinent surgical history.  Allergies: She has No Known Allergies.  Social History: Marital Status: single. She  reports that she has never smoked. She has never used smokeless tobacco.. She  reports no history of alcohol " use..       Exam:  VITAL SIGNS:   Vitals:    08/23/22 0318 08/23/22 0518   BP: 139/78 133/64   Pulse: 80 66   Resp: 16 17   Temp: 98.2 °F (36.8 °C)    TempSrc: Oral    SpO2: 98% 100%   Weight: 86.2 kg (190 lb)    Height: 5' (1.524 m)      Const: Awake and alert, NAD, sitting comfortably in a chair  Head: Atraumatic  Eyes: Normal Conjunctiva  ENT: Normal External Ears, Nose and Mouth.  Neck: Full range of motion. No meningismus.  Resp: Normal respiratory effort, No distress, CTAB  Cardio: Equal and intact distal pulses, RRR  Abd: Soft, non tender, non distended.  Back: No midline TTP, no stepoffs. No paraspinal TTP   Skin: No petechiae or rashes  Ext: No cyanosis, or edema  Neur: Awake and alert  Psych: Normal Mood and Affect    Data:  Results for orders placed or performed during the hospital encounter of 08/23/22   CBC auto differential   Result Value Ref Range    WBC 7.97 3.90 - 12.70 K/uL    RBC 3.86 (L) 4.00 - 5.40 M/uL    Hemoglobin 12.0 12.0 - 16.0 g/dL    Hematocrit 36.4 (L) 37.0 - 48.5 %    MCV 94 82 - 98 fL    MCH 31.1 (H) 27.0 - 31.0 pg    MCHC 33.0 32.0 - 36.0 g/dL    RDW 14.3 11.5 - 14.5 %    Platelets 222 150 - 450 K/uL    MPV 10.6 9.2 - 12.9 fL    Immature Granulocytes 0.3 0.0 - 0.5 %    Gran # (ANC) 4.1 1.8 - 7.7 K/uL    Immature Grans (Abs) 0.02 0.00 - 0.04 K/uL    Lymph # 3.0 1.0 - 4.8 K/uL    Mono # 0.5 0.3 - 1.0 K/uL    Eos # 0.3 0.0 - 0.5 K/uL    Baso # 0.04 0.00 - 0.20 K/uL    nRBC 0 0 /100 WBC    Gran % 51.9 38.0 - 73.0 %    Lymph % 37.6 18.0 - 48.0 %    Mono % 6.3 4.0 - 15.0 %    Eosinophil % 3.4 0.0 - 8.0 %    Basophil % 0.5 0.0 - 1.9 %    Differential Method Automated    Comprehensive metabolic panel   Result Value Ref Range    Sodium 140 136 - 145 mmol/L    Potassium 3.9 3.5 - 5.1 mmol/L    Chloride 109 95 - 110 mmol/L    CO2 22 (L) 23 - 29 mmol/L    Glucose 124 (H) 70 - 110 mg/dL    BUN 13 6 - 20 mg/dL    Creatinine 0.7 0.5 - 1.4 mg/dL    Calcium 10.1 8.7 - 10.5 mg/dL    Total Protein 8.0  6.0 - 8.4 g/dL    Albumin 4.0 3.5 - 5.2 g/dL    Total Bilirubin 0.3 0.1 - 1.0 mg/dL    Alkaline Phosphatase 121 55 - 135 U/L    AST 18 10 - 40 U/L    ALT 29 10 - 44 U/L    Anion Gap 9 8 - 16 mmol/L    eGFR >60.0 >60 mL/min/1.73 m^2   D dimer, quantitative   Result Value Ref Range    D-Dimer 0.52 (H) <0.50 mg/L FEU     Imaging Results          X-Ray Chest AP Portable (Final result)  Result time 08/23/22 05:16:33    Final result by Ja Rodriguez MD (08/23/22 05:16:33)                 Impression:      No radiographic evidence of acute intrathoracic process on this single view.      Electronically signed by: Ja Rodriguez MD  Date:    08/23/2022  Time:    05:16             Narrative:    EXAMINATION:  XR CHEST AP PORTABLE    CLINICAL HISTORY:  shortness of breath;    TECHNIQUE:  Single frontal view of the chest was performed.    COMPARISON:  Chest radiograph 07/09/2018, CTA chest 02/02/2019    FINDINGS:  Cardiac silhouette is not appear significantly enlarged.  Lungs are symmetrically expanded without evidence of confluent airspace consolidation.  No significant volume of pleural fluid or pneumothorax identified.  Osseous structures demonstrate mild degenerative change.                               X-Ray Lumbar Spine Ap And Lateral (Final result)  Result time 08/23/22 05:15:04    Final result by Ja Rodriguez MD (08/23/22 05:15:04)                 Impression:      No radiographic evidence of acute displaced fracture or traumatic subluxation of the lumbar spine.      Electronically signed by: Ja Rodriguez MD  Date:    08/23/2022  Time:    05:15             Narrative:    EXAMINATION:  XR LUMBAR SPINE AP AND LATERAL    CLINICAL HISTORY:  chronic low back pain, hx falls x8;    TECHNIQUE:  AP, lateral and spot images were performed of the lumbar spine.    COMPARISON:  None    FINDINGS:  Lumbar spine alignment appears within normal limits.  Lumbar vertebral body heights appear adequately maintained.  No  definite acute displaced fracture appreciated radiographically.  Intervertebral disc spaces are relatively well maintained.  There is mild lower lumbar facet arthropathy.  Presumed 3.5 cm calcified fibroid projects over the left hemipelvis.  There is atherosclerotic calcification of the abdominal aorta.                              12-LEAD EKG INTERPRETATION BY ME:  Rate/Rhythm: Normal Sinus Rhythm with rate of 69 beats per minute  QRS, ST, T-waves: Nonspecific ST changes  Impression: NSR, nonspecific ST changes    Labs & Imaging studies were reviewed independently by me.     Medical Decision Makin-year-old female presenting to the emergency department with chronic low back pain, concerned as she had similar symptoms when she was diagnosed with a PE.  She is also taking significant amounts of Tylenol.  LFTs were found to be normal and laboratory studies.  X-ray of her low back is unremarkable.  She denies any pain currently while sitting in the emergency room, the was given Toradol to help when she stands or walks.  D-dimer was obtained as I have a low suspicion of PE causing her low back pain despite this reportedly being her presenting symptom and was found to be elevated.  CTA was ordered and is pending at time of sign-out.    Clinical Impression:  1. Chronic right-sided low back pain without sciatica    2. Shortness of breath            Dee Islas MD  22 0600

## 2022-08-23 NOTE — ED NOTES
Pt identifiers Brando Leung were checked and are correct  LOC: The patient is awake, alert, aware of environment with an appropriate affect. Oriented x4, speaking appropriately  APPEARANCE: Pt resting comfortably, in no acute distress, pt is clean and well groomed, clothing properly fastened  SKIN: Skin warm, dry and intact, normal skin turgor, moist mucus membranes  RESPIRATORY: Airway is open and patent, respirations are spontaneous, even and unlabored, normal effort and rate  CARDIAC: Normal rate and rhythm, no peripheral edema noted, capillary refill < 3 seconds, bilateral radial pulses 2+  ABDOMEN: Soft, nontender, nondistended.   NEUROLOGIC:  facial expression is symmetrical, patient moving all extremities spontaneously, normal sensation in all extremities when touched with a finger.  Follows all commands appropriately  MUSCULOSKELETAL: No obvious deformities.

## 2022-08-23 NOTE — DISCHARGE INSTRUCTIONS
Do not take more than 1-2 pills of tylenol every 6-8 hours. You can take ibuprofen with tylenol or try over the counter lidocaine patches to help with your pain. See your primary doctor to ask about physical therapy.

## 2022-08-23 NOTE — PROVIDER PROGRESS NOTES - EMERGENCY DEPT.
Encounter Date: 8/23/2022    ED Physician Progress Notes        Physician Note:   Assumed care of patient at 0600 from Dr. Islas pending CT PE study.    CT result:    No definite evidence of acute pulmonary thromboembolism to the segmental pulmonary arterial level.     Stable bilateral solid pulmonary nodules when compared to prior CTA chest performed 02/02/2019.  For multiple solid nodules all <6 mm, Fleischner Society 2017 guidelines recommend no routine follow up for a low risk patient, or follow up with non-contrast chest CT at 12 months after discovery in a high risk patient.     There is scattered areas of ground-glass opacities in the periphery of the bilateral lungs, could suggest developing infectious/inflammatory process.    I reviewed the results with the patient at the bedside.  Informed of pulmonary nodules and need for follow-up with PCP.  She is not coughing, do not suspect pneumonia.  Pain is improved.  All questions answered prior to discharge.  Return precautions given.  Patient understands and agrees with the plan.